# Patient Record
Sex: FEMALE | Employment: FULL TIME | URBAN - METROPOLITAN AREA
[De-identification: names, ages, dates, MRNs, and addresses within clinical notes are randomized per-mention and may not be internally consistent; named-entity substitution may affect disease eponyms.]

---

## 2018-07-03 ENCOUNTER — OFFICE VISIT (OUTPATIENT)
Dept: URGENT CARE | Facility: URGENT CARE | Age: 28
End: 2018-07-03
Payer: COMMERCIAL

## 2018-07-03 ENCOUNTER — HOSPITAL ENCOUNTER (EMERGENCY)
Facility: CLINIC | Age: 28
Discharge: HOME | End: 2018-07-03
Attending: EMERGENCY MEDICINE | Admitting: EMERGENCY MEDICINE
Payer: COMMERCIAL

## 2018-07-03 VITALS
OXYGEN SATURATION: 100 % | BODY MASS INDEX: 24.6 KG/M2 | HEIGHT: 59 IN | WEIGHT: 122 LBS | HEART RATE: 67 BPM | TEMPERATURE: 98.4 F | DIASTOLIC BLOOD PRESSURE: 87 MMHG | RESPIRATION RATE: 18 BRPM | SYSTOLIC BLOOD PRESSURE: 126 MMHG

## 2018-07-03 VITALS
TEMPERATURE: 98.6 F | BODY MASS INDEX: 24.7 KG/M2 | SYSTOLIC BLOOD PRESSURE: 80 MMHG | OXYGEN SATURATION: 94 % | RESPIRATION RATE: 12 BRPM | HEART RATE: 76 BPM | DIASTOLIC BLOOD PRESSURE: 60 MMHG | HEIGHT: 59 IN | WEIGHT: 122.5 LBS

## 2018-07-03 DIAGNOSIS — S09.90XA CLOSED HEAD INJURY, INITIAL ENCOUNTER: Primary | ICD-10-CM

## 2018-07-03 DIAGNOSIS — S06.0X0A CONCUSSION WITHOUT LOSS OF CONSCIOUSNESS, INITIAL ENCOUNTER: ICD-10-CM

## 2018-07-03 PROCEDURE — 25000132 ZZH RX MED GY IP 250 OP 250 PS 637: Performed by: EMERGENCY MEDICINE

## 2018-07-03 PROCEDURE — 99214 OFFICE O/P EST MOD 30 MIN: CPT | Performed by: NURSE PRACTITIONER

## 2018-07-03 PROCEDURE — 99283 EMERGENCY DEPT VISIT LOW MDM: CPT

## 2018-07-03 RX ORDER — ACETAMINOPHEN 500 MG
1000 TABLET ORAL ONCE
Status: COMPLETED | OUTPATIENT
Start: 2018-07-03 | End: 2018-07-03

## 2018-07-03 RX ADMIN — ACETAMINOPHEN 1000 MG: 500 TABLET, FILM COATED ORAL at 18:50

## 2018-07-03 ASSESSMENT — ENCOUNTER SYMPTOMS
VOMITING: 0
HEADACHES: 1
NAUSEA: 1

## 2018-07-03 NOTE — ED AVS SNAPSHOT
Emergency Department    [address] [facility] [address]    Phone:  [phone]    Fax:  [phone]                                       Flaca Walker   MRN: [id number]    Department:   Emergency Department   Date of Visit:  7/3/2018           Patient Information     Date Of Birth          1990        Your diagnoses for this visit were:     Concussion without loss of consciousness, initial encounter        You were seen by Bari Freitas DO.      Follow-up Information     Follow up with Anusha Soriano In 2 days.    Specialty:  Family Practice    Contact information:    [facility] [place]  [address]     [facility] [id number] [phone]          Follow up with  Emergency Department.    Specialty:  EMERGENCY MEDICINE    Why:  If symptoms worsen    Contact information:    [address] [facility] [address] [phone]        Discharge Instructions       Discharge Instructions  Concussion    You were seen today for signs of a concussion.  The symptoms will vary, depending on the nature of your injury and your health. You may have: headache, confusion, nausea (feel sick to your stomach), vomiting (throwing up) and problems with memory, concentrating, or sleep. You may feel dizzy, irritable, and tired. Children and teens may need help from their parents, teachers, and coaches to watch for symptoms as they recover.    Generally, every Emergency Department visit should have a follow-up clinic visit with either a primary or a specialty clinic/provider. Please follow-up as instructed by your emergency provider today.     Return to the Emergency Department if:    Your headache gets worse or you start to have a really bad headache even with the recommended treatment plan.     You feel drowsier, have growing confusion, or slurred speech.     You keep repeating yourself.     You have strange behavior or are feeling more irritable.     You have a  seizure.     You vomit (throw up) more than once.     You have trouble walking.     You have weakness or numbness.    Your neck pain gets worse.     You have a loss of consciousness.     You have blood for fluid coming from your ears or nose.     You have new symptoms or anything that worries you.     Home Care:    Get lots of rest and get enough sleep at night. Take daytime naps or rest if you feel tired.     Limit physical activity and  thinking  activities. These can make symptoms worse.   o Physical activities include gym, sports, weight training, running, exercise, and heavy lifting.   o Thinking activities include homework, class work, job-related work, and screen time (phone, computer, tablet, TV, and video games).     Stick to a healthy diet and drink lots of fluids. Avoid alcohol.    As symptoms improve, you may slowly return to your daily activities. If symptoms get worse or return, reduce your activity.     Know that it is normal to feel sad or frustrated when you do not feel right and are less active.     Going Back to Work:    Your care team will tell you when you are ready to return to work.      Limit the amount of work you do soon after your injury. This may speed healing. Take breaks if your symptoms get worse. You should also reduce your physical activity as well as activities that require a lot of thinking until you see your doctor. You may need shorter work days and a lighter workload.  Avoid heavy lifting, working with machinery, driving and working at heights until your symptoms are gone or you are cleared by a provider.    Going Back to School:    If you are still having symptoms, you may need extra help at school.    Tell your teachers and school nurse about your injury and symptoms. Ask them to watch for problems with learning, memory, and concentrating. Symptoms may get worse when you do schoolwork, and you may become more irritable. You may need shorter school days, a reduced workload, and  to postpone testing.  Do not drive or take gym class (physical activity) until cleared by a provider.    Returning to Sports:    Never return to play if you have any symptoms. A full recovery will reduce the chances of getting hurt again. Remember, it is better to miss one or two games than a whole season.    You should rest from all physical activity until you see your provider. Generally, if all symptoms have completely cleared, your provider can help guide you to slowly return to sports. If symptoms return or worsen, stop the activity and see your provider.    Important: If you are in an organized sport and under age 18, you will need written consent from a healthcare provider before you return to sports. Typically, this will be your primary care or sports medicine provider. Please make an appointment.    If you were given a prescription for medicine here today, be sure to read all of the information (including the package insert) that comes with your prescription.  This will include important information about the medicine, its side effects, and any warnings that you need to know about.  The pharmacist who fills the prescription can provide more information and answer questions you may have about the medicine.  If you have questions or concerns that the pharmacist cannot address, please call or return to the Emergency Department.     Remember that you can always come back to the Emergency Department if you are not able to see your regular provider in the amount of time listed above, if you get any new symptoms, or if there is anything that worries you.      24 Hour Appointment Hotline       To make an appointment at any [facility], call [phone] ([phone]). If you don't have a family doctor or clinic, we will help you find one. [facility] are conveniently located to serve the needs of you and your family.             Review of your medicines      Our records show that you are taking the  medicines listed below. If these are incorrect, please call your family doctor or clinic.        Dose / Directions Last dose taken    acetaminophen-caffeine 500-65 MG Tabs   Commonly known as:  EXCEDRIN TENSION HEADACHE   Dose:  2 tablet        Take 2 tablets by mouth every 6 hours as needed   Refills:  0        aspirin 325 MG tablet   Dose:  2 tablet        Take 2 tablets by mouth as needed   Refills:  0        BENADRYL PO   Dose:  50 mg        Take 50 mg by mouth every 6 hours as needed for allergies   Refills:  0                Orders Needing Specimen Collection     None      Pending Results     No orders found from 7/1/2018 to 7/4/2018.            Pending Culture Results     No orders found from 7/1/2018 to 7/4/2018.            Pending Results Instructions     If you had any lab results that were not finalized at the time of your Discharge, you can call the ED Lab Result RN at [phone]. You will be contacted by this team for any positive Lab results or changes in treatment. The nurses are available 7 days a week from 10A to 6:30P.  You can leave a message 24 hours per day and they will return your call.        Test Results From Your Hospital Stay               Clinical Quality Measure: Blood Pressure Screening     Your blood pressure was checked while you were in the emergency department today. The last reading we obtained was    . Please read the guidelines below about what these numbers mean and what you should do about them.  If your systolic blood pressure (the top number) is less than 120 and your diastolic blood pressure (the bottom number) is less than 80, then your blood pressure is normal. There is nothing more that you need to do about it.  If your systolic blood pressure (the top number) is 120-139 or your diastolic blood pressure (the bottom number) is 80-89, your blood pressure may be higher than it should be. You should have your blood pressure rechecked within a year by a primary care  "provider.  If your systolic blood pressure (the top number) is 140 or greater or your diastolic blood pressure (the bottom number) is 90 or greater, you may have high blood pressure. High blood pressure is treatable, but if left untreated over time it can put you at risk for heart attack, stroke, or kidney failure. You should have your blood pressure rechecked by a primary care provider within the next 4 weeks.  If your provider in the emergency department today gave you specific instructions to follow-up with your doctor or provider even sooner than that, you should follow that instruction and not wait for up to 4 weeks for your follow-up visit.        Thank you for choosing [facility]       Thank you for choosing [facility] for your care. Our goal is always to provide you with excellent care. Hearing back from our patients is one way we can continue to improve our services. Please take a few minutes to complete the written survey that you may receive in the mail after you visit with us. Thank you!        [facility] Information     [employer] lets you send messages to your doctor, view your test results, renew your prescriptions, schedule appointments and more. To sign up, go to [email or web address] . Click on \"Log in\" on the left side of the screen, which will take you to the Welcome page. Then click on \"Sign up Now\" on the right side of the page.     You will be asked to enter the access code listed below, as well as some personal information. Please follow the directions to create your username and password.     Your access code is: [email or web address]  Expires: 10/1/2018  6:22 PM     Your access code will  in 90 days. If you need help or a new code, please call your [facility] clinic or [phone].        Care EveryWhere ID     This is your Care EveryWhere ID. This could be used by other organizations to access your [facility] medical records  [id number]        Equal Access to Services     TRAN CHRISTINA: Jitendra scott " bret Kurtz, enio carney, priyanka bond, sendy payton. So [facility] [phone].    ATENCIÓN: Si habla español, tiene a pinon disposición servicios gratuitos de asistencia lingüística. Llame al [phone].    We comply with applicable federal civil rights laws and [place] laws. We do not discriminate on the basis of race, color, national origin, age, disability, sex, sexual orientation, or gender identity.            After Visit Summary       This is your record. Keep this with you and show to your community pharmacist(s) and doctor(s) at your next visit.

## 2018-07-03 NOTE — ED PROVIDER NOTES
"  History     Chief Complaint:  Head Injury      The history is provided by the patient.      Flaca Walker is a 27 year old female who presents to the emergency department for evaluation of head injury. Around 1530 the patient was putting away groceries when she had bent down, she stood up and hit her head on the corner of the open refrigerator door resulting in intense head pain and nausea. The patient did not lose consciousness. This prompted the patient to seek evaluation here in the emergency department. Here, the patient indicates that her head pain has worsened since the initial hit and she feels nauseated to the point of wanting to throw up, but denies ever vomiting since the injury. The patient denies any loss of vision or double vision since the injury and has had no bleeding from the scalp.  The patient does not have hemophilia.  The patient is not on any anticoagulation.  The patient had no seizure.  The patient has no weakness in her extremities.  She is able to ambulate without concern.  There is no other injury associated with this.    Allergies:  Phenergan DM    Medications:    Acetaminophen-caffeine  Aspirin  Diphenhydramine    Past Medical History:    Allergic State    Past Surgical History:    The patient does not have any pertinent past surgical history.    Family History:    No past pertinent family history.    Social History:  Marital Status:   [2]  Tobacco Use: No  Alcohol Use: No      Review of Systems   Eyes: Negative for visual disturbance.   Gastrointestinal: Positive for nausea. Negative for vomiting.   Neurological: Positive for headaches.       Physical Exam     Patient Vitals for the past 24 hrs:   BP Temp Temp src Pulse Resp SpO2 Height Weight   07/03/18 1910 126/87 98.4  F (36.9  C) Oral 67 18 100 % 1.499 m (4' 11\") 55.3 kg (122 lb)   07/03/18 1908 - - - - - 100 % - -   07/03/18 1907 126/87 - - - - - - -     Physical Exam  Physical Exam   General:  Sitting on bed.  HENT:  No " obvious trauma to head, including no step-off, deformity, hematoma or laceration to the area where the patient reports she hit her head.   Right Ear:  External ear normal.   Left Ear:  External ear normal.   Nose:  Nose normal.   Eyes:  Conjunctivae and EOM are normal.  Neck: Normal range of motion. Neck supple. No tracheal deviation present.   Pulm/Chest: No respiratory distress  M/S: Normal range of motion.   Neuro: Alert. GCS 15. CN II-XII Grossly intact, no pronator drift, normal finger-nose-finger, visual fields intact by confrontation. Muscle strength is +5 proximal and distal in the bilateral upper and lower extremities. No dysarthria. Normal palm up, palm down.  Skin: Skin is warm and dry. No rash noted. Not diaphoretic.   Psych: Normal mood and affect. Behavior is normal.       Emergency Department Course   Interventions:  1850 Tylenol 1000 mg, PO    Emergency Department Course:  1835 Nursing notes and vitals reviewed. I performed an exam of the patient as documented above.     1845 I rechecked the patient and discussed the results of her workup thus far.     Findings and plan explained to the patient. Patient discharged home with instructions regarding supportive care, medications, and reasons to return. The importance of close follow-up was reviewed.     I personally answered all related questions prior to discharge.      [place] Head CT Rule  (calculator)  Background  Assesses need for head imaging in acute trauma  Only validated in adults with GCS 13-15 with witnessed LOC, amnesia to head injury or confusion  Data  27 year old  High Risk Criteria (major criteria)   Of 5 possible items  NEGATIVE    Moderate Risk Criteria (minor criteria)   Of 3 possible items  NEGATIVE    Interpretation  No indications for head imaging    Impression & Plan      Medical Decision Making:  Flaca Walker is a very pleasant 27 year old female who presents for evaluation after hitting her head on the corner of her refrigerator.  This patient has a history and clinical exam consistent with concussion.  The differential diagnosis includes skull fracture, epidural hematoma, subdural hematoma, intracerebral hemorrhage, and traumatic subarachnoid hemorrhage; all of these are highly unlikely in this clinical setting. They do not warrant head ct imaging based upon [place] head CT rules and discussed risk/benefit ratio with patient in detail.  The patient is in agreement against a head CT.  Return to ED for red flags (change in behavior, drowsiness, seizures, vomiting, etc) and gave concussion precautions for home.  I did stress importance of avoiding a second concussion while brain heals.  The patients head to toe trauma exam is otherwise negative for serious underlying disease of the head, neck, chest, abdomen, extremities, pelvis    The treatment plan was discussed with the patient and they expressed understanding of this plan and consented to the plan.  In addition, the patient will return to the emergency department if their symptoms persist, worsen, if new symptoms arise or if there is any concern as other pathology may be present that is not evident at this time. They also understand the importance of close follow up in the clinic and if unable to do so will return to the emergency department for a reevaluation. All questions were answered.      Diagnosis:    ICD-10-CM    1. Concussion without loss of consciousness, initial encounter S06.0X0A        Disposition:  discharged to home    Scribe Disclosure:  IContreras, am serving as a scribe on 7/3/2018 at 6:37 PM to personally document services performed by Bari Freitas DO based on my observations and the provider's statements to me.       Contreras Armas  7/3/2018    EMERGENCY DEPARTMENT       Bari Freitas DO  07/03/18 2002

## 2018-07-03 NOTE — ED AVS SNAPSHOT
Emergency Department    [address] [place] [address]    Phone:  [phone]    Fax:  [phone]                                       Flaca Walker   MRN: [id number]    Department:   Emergency Department   Date of Visit:  7/3/2018           After Visit Summary Signature Page     I have received my discharge instructions, and my questions have been answered. I have discussed any challenges I see with this plan with the nurse or doctor.    ..........................................................................................................................................  Patient/Patient Representative Signature      ..........................................................................................................................................  Patient Representative Print Name and Relationship to Patient    ..................................................               ................................................  Date                                            Time    ..........................................................................................................................................  Reviewed by Signature/Title    ...................................................              ..............................................  Date                                                            Time

## 2018-07-03 NOTE — PROGRESS NOTES
"SUBJECTIVE:  Flaca Walker is a 27 year old female who comes in for evaluation of headache.  Headache began 3hour(s)}ago and is worsening  DESCRIPTION OF HEADACHE:   Location of pain: occipital   Radiation of pain?: NO   Character of pain:sharp and aching   Severity of pain: severe   Accompanying symptoms: nausea, vertigo and generalized weakness     Patient reports she stood up and and struck the midline posterior region of her head on the freezer door at home about three hours ago. The pain knocked her to the ground and she was unable to stand for about fifteen minutes. She now feels very nauseous and dizzy but has not vomited. She denies any focal weakness, vision change, otorrhea or rhinorrhea.             ADDITIONAL RELEVANT HISTORY:    Neurologic ROS: Denies, syncope, sensory deficits, paresthesias, aphasia, tremors and involuntary movements.    Past Medical History:   Diagnosis Date     Allergic state      Current Outpatient Prescriptions   Medication Sig Dispense Refill     acetaminophen-caffeine (EXCEDRIN TENSION HEADACHE) 500-65 MG TABS Take 2 tablets by mouth every 6 hours as needed       aspirin 325 MG tablet Take 2 tablets by mouth as needed       DiphenhydrAMINE HCl (BENADRYL PO) Take 50 mg by mouth every 6 hours as needed for allergies        Social History   Substance Use Topics     Smoking status: Never Smoker     Smokeless tobacco: Never Used     Alcohol use Yes       ROS:   Review of systems negative except as stated above.  OBJECTIVE:  BP (!) 80/60  Pulse 76  Temp 98.6  F (37  C) (Oral)  Resp 12  Ht 4' 10.5\" (1.486 m)  Wt 122 lb 8 oz (55.6 kg)  SpO2 94%  BMI 25.17 kg/m2  GENERAL APPEARANCE: healthy, alert and no distress  EYES: EOMI,  PERRL, conjunctiva clear  CV: regular rates and rhythm, normal S1 S2, no murmur noted  NEURO: Normal strength and tone, sensory exam grossly normal,  normal speech and mentation  SKIN: no suspicious lesions or rashes    ASSESSMENT:  (S09.90XA) Closed head " injury, initial encounter  (primary encounter diagnosis)  Comment: Due to severity of pain, associated nausea, weakness and dizziness, should proceed to ER for further evaluation.  Plan: Patient informed of plan, will proceed to ER. Report called to Sunni.        PLAN:  See orders in Epic

## 2018-07-03 NOTE — MR AVS SNAPSHOT
"              After Visit Summary   7/3/2018    Flaca Walker    MRN: [id number]           Patient Information     Date Of Birth          1990        Visit Information        Provider Department      7/3/2018 5:35 PM Latoya Washington APRN CNP [facility]        Today's Diagnoses     Closed head injury, initial encounter    -  1       Follow-ups after your visit        Who to contact     If you have questions or need follow up information about today's clinic visit or your schedule please contact [facility] directly at [phone].  Normal or non-critical lab and imaging results will be communicated to you by [employer], letter or phone within 4 business days after the clinic has received the results. If you do not hear from us within 7 days, please contact the clinic through [employer] or phone. If you have a critical or abnormal lab result, we will notify you by phone as soon as possible.  Submit refill requests through [employer] or call your pharmacy and they will forward the refill request to us. Please allow 3 business days for your refill to be completed.          Additional Information About Your Visit        MyChart Information     [employer] lets you send messages to your doctor, view your test results, renew your prescriptions, schedule appointments and more. To sign up, go to [email or web address] . Click on \"Log in\" on the left side of the screen, which will take you to the Welcome page. Then click on \"Sign up Now\" on the right side of the page.     You will be asked to enter the access code listed below, as well as some personal information. Please follow the directions to create your username and password.     Your access code is: [email or web address]  Expires: 10/1/2018  6:22 PM     Your access code will  in 90 days. If you need help or a new code, please call your [place] clinic or [phone].        Care EveryWhere ID     This is your Care " "EveryWhere ID. This could be used by other organizations to access your [facility] medical records  [id number]        Your Vitals Were     Pulse Temperature Respirations Height Pulse Oximetry BMI (Body Mass Index)    76 98.6  F (37  C) (Oral) 12 4' 10.5\" (1.486 m) 94% 25.17 kg/m2       Blood Pressure from Last 3 Encounters:   07/03/18 (!) 80/60   08/13/15 100/72   06/05/15 (!) 130/92    Weight from Last 3 Encounters:   07/03/18 122 lb 8 oz (55.6 kg)   08/13/15 132 lb 14.4 oz (60.3 kg)   06/05/15 130 lb (59 kg)              Today, you had the following     No orders found for display       Primary Care Provider Office Phone # Fax #    Anusha Soriano [phone]       [facility] [place] [address]    [facility] [id number]        Equal Access to Services     [facility]: Hadii aad ku hadasho Soomaali, waaxda luqadaha, qaybta kaalmada adeegyada, waxay idiin hayaan adeeg kharash la'cjn . So [facility] [phone].    ATENCIÓN: Si habla español, tiene a pinon disposición servicios gratuitos de asistencia lingüística. Llame al [phone].    We comply with applicable federal civil rights laws and [place] laws. We do not discriminate on the basis of race, color, national origin, age, disability, sex, sexual orientation, or gender identity.            Thank you!     Thank you for choosing [facility]  for your care. Our goal is always to provide you with excellent care. Hearing back from our patients is one way we can continue to improve our services. Please take a few minutes to complete the written survey that you may receive in the mail after your visit with us. Thank you!             Your Updated Medication List - Protect others around you: Learn how to safely use, store and throw away your medicines at www.disposemymeds.org.          This list is accurate as of 7/3/18  6:22 PM.  Always use your most recent med list.                   Brand Name Dispense " Instructions for use Diagnosis    acetaminophen-caffeine 500-65 MG Tabs    EXCEDRIN TENSION HEADACHE     Take 2 tablets by mouth every 6 hours as needed        aspirin 325 MG tablet      Take 2 tablets by mouth as needed        BENADRYL PO      Take 50 mg by mouth every 6 hours as needed for allergies